# Patient Record
(demographics unavailable — no encounter records)

---

## 2024-10-29 NOTE — ASSESSMENT
[FreeTextEntry1] : Follow up visit: Benign HTN, CAD, multiple vessel, Cellulitis, DM II controlled -BP is stable. Continue current management. - Check A1c. Lipid panel, vitamin levels, urine analysis, TSH, PSA Total - Start Cephalexin 500 MG, Erythromycin 5MG-possible cellulitis   - RTO in 3 months     Pt verbalized understanding and will reach should any questions/concerns occur.

## 2024-10-29 NOTE — ADDENDUM
[FreeTextEntry1] : I, Larry Tom, acted as a scribe on behalf of Dr. Brandon Owens MD, on 10/29/2024.   All medical entries made by the scribe were at my, Dr. Brandon Owens MD, direction and personally dictated by me on 10/29/2024. I have reviewed the chart and agree that the record accurately reflects my personal performance of the history, physical exam, assessment and plan. I have also personally directed, reviewed, and agreed with the chart.

## 2024-10-29 NOTE — PHYSICAL EXAM
[Normal] : soft, non-tender, non-distended, no masses palpated, no HSM and normal bowel sounds [de-identified] : erythema upper eyelid slight tenderness to palpation

## 2024-10-29 NOTE — HEALTH RISK ASSESSMENT
[Yes] : Yes [No] : In the past 12 months have you used drugs other than those required for medical reasons? No [Never] : Never [de-identified] : occasional

## 2024-10-29 NOTE — HISTORY OF PRESENT ILLNESS
[Spouse] : spouse [FreeTextEntry1] : Patient is present today for a follow up visit.  [de-identified] : Patient is a 55 yr old male present today for a follow up visit.  Patient c/o splinter in eye, removed on top of eyelid, taken out two days ago, still red, irritated, slight pain. Reports able to look left to right. Reports losing weight and taking medications as prescribed. Denies having colonoscopy. Reports Talha horses and taking Magnesium to help. Reports trying to watch diet with sugar. Discussed Dexcom 7. Denies having any allergies to antibiotics.    Denies any CP, chest tightness or SOB. Denies any abdominal pain, urinary symptom, or change in bowel habits. Denies any fever, chills, or night sweats.

## 2025-02-25 NOTE — REASON FOR VISIT
[Hyperlipidemia] : hyperlipidemia [Hypertension] : hypertension [Coronary Artery Disease] : coronary artery disease [Follow-Up - Clinic] : a clinic follow-up of [FreeTextEntry2] : CAD PCI to RCA and OM

## 2025-02-25 NOTE — DISCUSSION/SUMMARY
[EKG obtained to assist in diagnosis and management of assessed problem(s)] : EKG obtained to assist in diagnosis and management of assessed problem(s) [FreeTextEntry1] : 56 year old man with known CAD sp IWMI PCI to RCA and OM, preserved EF, HTN HLD.  #CAD- IWMI with PCI to RCA in August 2019. On ASA daily. EKG unchanged. Fixed infarct on Nuclear Stress Test in August 2020 Continue to monitor #HTN- On Toprol and Losartan 25 mg  condition is stable. Continue present meds Monitor BP at home and call if elevated in 1 week #HLD- , , HDL 35, ,  On Statin therapy, continue Atorvastatin 80 mg   Advised lifestyle modification: In corporate exercise and healthy eating habits, focusing on a Mediterranean style of eating and aiming for the recommended 150 minutes per week of moderate physical activity. If LDL not optimized ( Goal < 70) will consider PCSK-9 Advised to repeat labs in 6 weeks ( fasting)   # DM : A1c 7.5  Advised to start previously prescribed Metformin and F/u PMD

## 2025-02-25 NOTE — HISTORY OF PRESENT ILLNESS
[FreeTextEntry1] : Here for follow up. Last seen in 3/2024. According to wife - A1c elevated now - Metformin was prescribed in the past but patient did not take.  Review of labs indicates LDL elevated at 137 despite Atorvastatin 80 mg. According both patient and wife ( they eat out a lot and consume a lot of red meat). Patient works as a  and works > 80 hrs in a week. Does not exercise. Denies any CP, SOB, dizziness, LH or palpitations.   #CAD- IWMI with PCI to RCA in August 2019. On ASA daily. EKG unchanged. Fixed infarct on Nuclear Stress Test in August 2020 Continue to monitor  #HTN- On Toprol and Losartan 25 mg  condition is stable. Continue present meds Monitor BP at home and call if elevated in 1 week  #HLD- , , HDL 35, ,  On Statin therapy, continue Atorvastatin 80 mg   Advised lifestyle modification: In corporate exercise and healthy eating habits, focusing on a Mediterranean style of eating and aiming for the recommended 150 minutes per week of moderate physical activity. If LDL not optimized ( Goal < 70) will consider PCSK-9 Advised to repeat labs in 6 weeks ( fasting)    # DM : A1c 7.5  Advised to start previously prescribed Metformin and F/u PMD

## 2025-05-14 NOTE — DISCUSSION/SUMMARY
[EKG obtained to assist in diagnosis and management of assessed problem(s)] : EKG obtained to assist in diagnosis and management of assessed problem(s) [FreeTextEntry1] : 56 year old man with known CAD sp IWMI PCI to RCA and OM, preserved EF, HTN HLD.  #CAD- IWMI with PCI to RCA in August 2019. On ASA daily. EKG unchanged. Fixed infarct on Nuclear Stress Test in August 2020 Continue to monitor #HTN- On Toprol and Losartan 25 mg  condition is stable. Continue present meds Monitor BP at home and call if elevated in 1 week #HLD- , , HDL 35, ,  On Statin therapy, continue Atorvastatin 80 mg  If LDL not optimized ( Goal < 70) will consider PCSK-9 Advised to repeat labs in 6 weeks ( fasting)   # DM : A1c 7.5  Advised to start previously prescribed Metformin and F/u PMD

## 2025-05-14 NOTE — HISTORY OF PRESENT ILLNESS
[FreeTextEntry1] : Here for follow up.  According to wife - A1c elevated now - Metformin was prescribed in the past but patient did not take.  Review of labs indicates LDL elevated at 137 despite Atorvastatin 80 mg. According both patient and wife ( they eat out a lot and consume a lot of red meat).  #CAD- IWMI with PCI to RCA in August 2019. On ASA daily. EKG unchanged. Fixed infarct on Nuclear Stress Test in August 2020 Continue to monitor  #HTN- On Toprol and Losartan 25 mg  condition is stable. Continue present meds  #HLD- , , HDL 35, ,  On Statin therapy, continue Atorvastatin 80 mg  check lipids with PCP  # DM : A1c 7.5  Advised to start previously prescribed Metformin and F/u PMD  Never went for labs- encouraged repeat

## 2025-07-10 NOTE — HEALTH RISK ASSESSMENT
[Good] : ~his/her~  mood as  good [Yes] : Yes [No] : In the past 12 months have you used drugs other than those required for medical reasons? No [0] : 2) Feeling down, depressed, or hopeless: Not at all (0) [PHQ-2 Negative - No further assessment needed] : PHQ-2 Negative - No further assessment needed [Time Spent: ___ Minutes] : I spent [unfilled] minutes performing a depression screening for this patient. [Never] : Never [NO] : No [[id number]] : 0 [FreeTextEntry1] : health maintenance  [de-identified] : occasionally  [de-identified] : previously done [BoneDensityComments] :  n/a  [ColonoscopyComments] :  n/a

## 2025-07-10 NOTE — HEALTH RISK ASSESSMENT
[Good] : ~his/her~  mood as  good [Yes] : Yes [No] : In the past 12 months have you used drugs other than those required for medical reasons? No [0] : 2) Feeling down, depressed, or hopeless: Not at all (0) [PHQ-2 Negative - No further assessment needed] : PHQ-2 Negative - No further assessment needed [Time Spent: ___ Minutes] : I spent [unfilled] minutes performing a depression screening for this patient. [Never] : Never [NO] : No [[id number]] : 0 [FreeTextEntry1] : health maintenance  [de-identified] : occasionally  [de-identified] : previously done [BoneDensityComments] :  n/a  [ColonoscopyComments] :  n/a

## 2025-07-10 NOTE — ADDENDUM
[FreeTextEntry1] :  I, Naz Lea, acted as a scribe on behalf of Dr. Brandon Owens MD, on 07/08/2025.  All medical entries made by the scribe were at my, Dr. Brandon Owens MD, direction and personally dictated by me on 07/08/2025. I have reviewed the chart and agree that the record accurately reflects my personal performance of the history, physical exam, assessment and plan. I have also personally directed, reviewed, and agreed with the chart.

## 2025-07-10 NOTE — ASSESSMENT
[Vaccines Reviewed] : Immunizations reviewed today. Please see immunization details in the vaccine log within the immunization flowsheet.  [FreeTextEntry1] : Annual Physical Exam: CAD (multiple vessel) - EKG stable - BP is stable. Continue current management. - Order US Duplex Carotid Arteries Complete (Bilateral)-assess for stenosis -Cologuard ordered, advised shingles vaccine   - RTO annually or as needed.   Pt verbalized understanding and will reach out should any questions/concerns occur.